# Patient Record
Sex: MALE | Race: WHITE | NOT HISPANIC OR LATINO | Employment: UNEMPLOYED | URBAN - METROPOLITAN AREA
[De-identification: names, ages, dates, MRNs, and addresses within clinical notes are randomized per-mention and may not be internally consistent; named-entity substitution may affect disease eponyms.]

---

## 2018-11-10 ENCOUNTER — HOSPITAL ENCOUNTER (EMERGENCY)
Facility: HOSPITAL | Age: 38
Discharge: HOME | End: 2018-11-10
Attending: EMERGENCY MEDICINE
Payer: MEDICAID

## 2018-11-10 VITALS
HEART RATE: 89 BPM | TEMPERATURE: 99 F | DIASTOLIC BLOOD PRESSURE: 78 MMHG | RESPIRATION RATE: 18 BRPM | OXYGEN SATURATION: 98 % | BODY MASS INDEX: 21.24 KG/M2 | HEIGHT: 70 IN | SYSTOLIC BLOOD PRESSURE: 136 MMHG | WEIGHT: 148.38 LBS

## 2018-11-10 DIAGNOSIS — Z53.29 LEFT AGAINST MEDICAL ADVICE: ICD-10-CM

## 2018-11-10 DIAGNOSIS — S60.022A CONTUSION OF LEFT INDEX FINGER WITHOUT DAMAGE TO NAIL, INITIAL ENCOUNTER: ICD-10-CM

## 2018-11-10 DIAGNOSIS — M79.645 FINGER PAIN, LEFT: Primary | ICD-10-CM

## 2018-11-10 PROCEDURE — 25000003 PHARM REV CODE 250: Performed by: NURSE PRACTITIONER

## 2018-11-10 PROCEDURE — 99283 EMERGENCY DEPT VISIT LOW MDM: CPT

## 2018-11-10 RX ORDER — HYDROCODONE BITARTRATE AND ACETAMINOPHEN 10; 325 MG/1; MG/1
1 TABLET ORAL
Status: COMPLETED | OUTPATIENT
Start: 2018-11-10 | End: 2018-11-10

## 2018-11-10 RX ADMIN — HYDROCODONE BITARTRATE AND ACETAMINOPHEN 1 TABLET: 10; 325 TABLET ORAL at 07:11

## 2018-11-11 NOTE — ED PROVIDER NOTES
SCRIBE #1 NOTE: I, Corinne Mack, am scribing for, and in the presence of, Zoë Sheridan NP. I have scribed the entire note.      History      Chief Complaint   Patient presents with    Finger Injury     left middle finger swollen - states  and thinks got something in it.  Swelling for past few days       Review of patient's allergies indicates:  No Known Allergies     HPI   HPI    11/10/2018, 7:21 PM   History obtained from the patient      History of Present Illness: Dell Gallegos is a 38 y.o. male patient who presents to the Emergency Department for worsening L long finger pain which onset gradually yesterday. Pt was working on a car and feels that he may have scratched his hand and got either rust or a chemical of some kind into the wound. Symptoms are constant and moderate in severity. Palpation and movement worsens the pt's pain. No mitigating factors reported. Associated sxs include L long finger swelling which onset today. Patient denies any fever, chills, N/V, back pain, neck pain, arthralgias, HA, dizziness, and all other sxs at this time. No prior Tx reported. No further complaints or concerns at this time.         Arrival mode: Personal vehicle    PCP: Primary Doctor No       Past Medical History:  History reviewed. No pertinent past medical history.    Past Surgical History:  History reviewed. No pertinent surgical history.        Family History:  Family history reviewed not relevant      Social History:  Social History    Social History Main Topics    Social History Main Topics    Smoking status: Unknown if ever smoked    Smokeless tobacco: Unknown if ever used    Alcohol Use: Unknown drinking history    Drug Use: Unknown if ever used    Sexual Activity: Unknown         ROS   Review of Systems   Constitutional: Negative for chills and fever.   Respiratory: Negative for cough and shortness of breath.    Cardiovascular: Negative for chest pain and leg swelling.    Gastrointestinal: Negative for abdominal pain, diarrhea, nausea and vomiting.   Musculoskeletal: Negative for arthralgias, back pain, joint swelling, neck pain and neck stiffness.        (+) L long finger pain  (+) L long finger swelling   Skin: Negative for rash and wound.   Neurological: Negative for dizziness, light-headedness, numbness and headaches.   All other systems reviewed and are negative.    Physical Exam      Initial Vitals [11/10/18 1900]   BP Pulse Resp Temp SpO2   132/73 100 16 98.5 °F (36.9 °C) 97 %      MAP       --          Physical Exam  Nursing Notes and Vital Signs Reviewed.  Constitutional: Patient is in no acute distress. Well-developed and well-nourished.  Head: Atraumatic. Normocephalic.  Eyes: PERRL. EOM intact. Conjunctivae are not pale. No scleral icterus.  ENT: Mucous membranes are moist. Oropharynx is clear and symmetric.    Neck: Supple. Full ROM. No lymphadenopathy.  Cardiovascular: Regular rate. Regular rhythm. No murmurs, rubs, or gallops. Distal pulses are 2+ and symmetric.  Pulmonary/Chest: No respiratory distress. Clear to auscultation bilaterally. No wheezing or rales.  Abdominal: Soft and non-distended.  There is no tenderness.  No rebound, guarding, or rigidity.   Musculoskeletal: Moves all extremities. No obvious deformities. There is swelling and mild erythema noted to the L long finger with some bruising noted around the knuckle. There is DROM of the L long finger secondary to pain; pt is able to extend his finger, but is unable to flex it.  Skin: Warm and dry.  Neurological:  Alert, awake, and appropriate.  Normal speech.  No acute focal neurological deficits are appreciated.  Psychiatric: Normal affect. Good eye contact. Appropriate in content.            ED Course    Procedures  ED Vital Signs:  Vitals:    11/10/18 1900 11/10/18 2038   BP: 132/73 136/78   Pulse: 100 89   Resp: 16 18   Temp: 98.5 °F (36.9 °C)    TempSrc: Oral    SpO2: 97% 98%   Weight: 67.3 kg (148 lb  "5.9 oz)    Height: 5' 10" (1.778 m)        Abnormal Lab Results:  Labs Reviewed - No data to display     All Lab Results:  None    Imaging Results:  Imaging Results          X-Ray Hand 3 View Left (Final result)  Result time 11/10/18 19:55:51    Final result by JANES Stevens Sr., MD (11/10/18 19:55:51)                 Impression:      There is mild prominence of the soft tissue thickness around the middle phalanx of the long finger.  This is consistent with the patient's history and characteristic of a soft tissue contusion.      Electronically signed by: Marques Stevens MD  Date:    11/10/2018  Time:    19:55             Narrative:    EXAMINATION:  XR HAND COMPLETE 3 VIEW LEFT    CLINICAL HISTORY:  trauma;    COMPARISON:  None    FINDINGS:  There is no fracture. There is no dislocation.  There is mild prominence of the soft tissue thickness around the middle phalanx of the long finger.                               X-Ray Hand 3 View Right (In process)                         The Emergency Provider reviewed the vital signs and test results, which are outlined above.    ED Discussion     8:55 PM: Pt is A&O x3, appropriate and competent at this time. Pt voices desire to leave hospital. D/w pt in length need for further evaluation and treatment due to HPI and PEx. Pt declines any further evaluation or tx at this time. All risks, including worsening sx, permanent bodily harm and death, were discussed in length. Pt acknowledges all risk at this time and agrees to sign AMA form. Pt given RTER instructions. All questions and concerns addressed at this time. Pt leaving AMA.       ED Medication(s):  Medications   HYDROcodone-acetaminophen  mg per tablet 1 tablet (1 tablet Oral Given 11/10/18 1942)          Medication List      You have not been prescribed any medications.               Medical Decision Making    Medical Decision Making:   Clinical Tests:   Radiological Study: Ordered and Reviewed           Scribe " Attestation:   Scribe #1: I performed the above scribed service and the documentation accurately describes the services I performed. I attest to the accuracy of the note 11/10/2018 8:56 PM.    Attending:   Physician Attestation Statement for Scribe #1: I, Zoë Sheridan NP, personally performed the services described in this documentation, as scribed by Corinne Mack, in my presence, and it is both accurate and complete.          Clinical Impression       ICD-10-CM ICD-9-CM   1. Finger pain, left M79.645 729.5   2. Contusion of left index finger without damage to nail, initial encounter S60.022A 923.3   3. Left against medical advice Z53.20 V64.2       Disposition:   Disposition: AMA  AMA: Patient left AMA after: MD assigned, HPI, exam, x-rays done and x-ray viewed. ED DISP AMA LIST2: awaiting ortho consult. Patient status: competent, oriented x 3 and not intoxicated. AMA Form: signed by patient            Zoë Sheridan NP  11/10/18 2103